# Patient Record
Sex: MALE | Race: WHITE | NOT HISPANIC OR LATINO | URBAN - NONMETROPOLITAN AREA
[De-identification: names, ages, dates, MRNs, and addresses within clinical notes are randomized per-mention and may not be internally consistent; named-entity substitution may affect disease eponyms.]

---

## 2016-01-22 PROBLEM — H52.4: Noted: 2018-01-11

## 2016-01-22 PROBLEM — H43.23: Noted: 2018-01-11

## 2016-01-22 PROBLEM — E11.3213: Noted: 2018-01-11

## 2018-01-11 PROBLEM — H43.21: Noted: 2018-01-11

## 2018-01-11 PROBLEM — E11.3293: Noted: 2018-01-11

## 2019-01-15 ENCOUNTER — IMPORTED ENCOUNTER (OUTPATIENT)
Dept: URBAN - NONMETROPOLITAN AREA CLINIC 1 | Facility: CLINIC | Age: 74
End: 2019-01-15

## 2019-01-15 PROCEDURE — 92014 COMPRE OPH EXAM EST PT 1/>: CPT

## 2019-01-15 PROCEDURE — 92134 CPTRZ OPH DX IMG PST SGM RTA: CPT

## 2019-01-15 PROCEDURE — 92015 DETERMINE REFRACTIVE STATE: CPT

## 2019-01-15 NOTE — PATIENT DISCUSSION
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  If any changed noted patient to call or come into the office ASAP - Recommend patient seeing Dr. Omayra Rome patient agrees with plan- Continue to [place] OU- Discussed diagnosis in detail with patient- Discussed signs and symptoms of progression- Discussed UV protection- Progression noted on todays exam - REcommend cataract eval with Dr. Marino Level will consider after patient sees Dr. Omayra Rome- Continue to monitorAsteroid Hyalosis OD- Discussed diagnosis in detail with patient- Patient is stable at this time- Continue to monitorHyperopia / Presbyopia OU- Discussed diagnosis in detail with patient- Recommend holding off on glasses Rx at this time - Conitnue to monitor; Dr's Notes: MR 1/15/19DFE 1/15/19Optos 1/22/16OCT 1/15/19

## 2021-03-10 ENCOUNTER — IMPORTED ENCOUNTER (OUTPATIENT)
Dept: URBAN - NONMETROPOLITAN AREA CLINIC 1 | Facility: CLINIC | Age: 76
End: 2021-03-10

## 2021-03-10 PROBLEM — H25.813: Noted: 2021-03-10

## 2021-03-10 PROBLEM — H52.4: Noted: 2018-01-11

## 2021-03-10 PROBLEM — E11.3293: Noted: 2018-01-11

## 2021-03-10 PROBLEM — H43.21: Noted: 2018-01-11

## 2021-03-10 PROBLEM — E11.3213: Noted: 2018-01-11

## 2021-03-10 PROBLEM — H43.23: Noted: 2018-01-11

## 2021-03-10 PROBLEM — H25.812: Noted: 2021-03-10

## 2021-03-10 PROCEDURE — 92014 COMPRE OPH EXAM EST PT 1/>: CPT

## 2021-03-10 PROCEDURE — 92015 DETERMINE REFRACTIVE STATE: CPT

## 2021-03-10 NOTE — PATIENT DISCUSSION
NIDDM with edema 15 years- Discussed diagnosis in detail with patient- OCT done previously show macula edema OD but improved but decreased VA noted today and OS WNL - Stressed importance of good blood sugar control- Patient states last A1C was under 7 and last blood sugar was 132- Recommend no sodas- Patient was given a [employer] previously to follow. If any changed noted patient to call or come into the office ASAP - Patient has been released from Dr. Sosa Devries- Notes to [address] [facility] - Continue to monitorCataracts OU- Discussed diagnosis in detail with patient- Discussed signs and symptoms of progression- Discussed UV protection- Progression noted on todays exam- Will re-evaluate in 6 months.  May consider referral with Dr. Neo Cintron - Continue to monitorAsteroid Hyalosis OD- Discussed diagnosis in detail with patient- Patient is stable at this time- Continue to monitorHyperopia / Presbyopia OU- Discussed diagnosis in detail with patient- Explained changing glasses will not help alot due to cataracts progressing - Conitnue to monitor; 's Notes: MR 1/15/19DFE 1/15/19Optos 1/22/16OCT 1/15/19

## 2021-09-15 ENCOUNTER — IMPORTED ENCOUNTER (OUTPATIENT)
Dept: URBAN - NONMETROPOLITAN AREA CLINIC 1 | Facility: CLINIC | Age: 76
End: 2021-09-15

## 2021-09-15 PROCEDURE — 92134 CPTRZ OPH DX IMG PST SGM RTA: CPT

## 2021-09-15 PROCEDURE — 99214 OFFICE O/P EST MOD 30 MIN: CPT

## 2021-09-15 NOTE — PATIENT DISCUSSION
NIDDM with edema 15 years- Discussed diagnosis in detail with patient- OCT done today show macula edema OD but stable from 2019 scan- Stressed importance of good blood sugar control- Patient states last A1C was under 7.2- Recommend no sodas- Patient was given a [address] previously to follow.  If any changed noted patient to call or come into the office ASAP - Patient has been released from Dr. Daugherty Face- Notes to [address] [facility] - Continue to monitorCataracts OU- Discussed diagnosis in detail with patient- Discussed signs and symptoms of progression- Discussed UV protection- VA has dropped off OU since last visit  - Progression noted on todays exam would like to get Dr. Jennifer Swift blessing before proceeding with cataract eval - Continue to monitorAsteroid Hyalosis OD- Discussed diagnosis in detail with patient- Patient is stable at this time- Continue to monitorHyperopia / Presbyopia OU- Discussed diagnosis in detail with patient- Explained changing glasses will not help alot due to cataracts progressing - Conitnue to monitor; 's Notes: MR 1/15/19DFE 1/15/19Optos 1/22/16OCT 9/15/21

## 2022-01-18 ENCOUNTER — IMPORTED ENCOUNTER (OUTPATIENT)
Dept: URBAN - NONMETROPOLITAN AREA CLINIC 1 | Facility: CLINIC | Age: 77
End: 2022-01-18

## 2022-01-18 PROCEDURE — 99214 OFFICE O/P EST MOD 30 MIN: CPT

## 2022-01-18 PROCEDURE — 92134 CPTRZ OPH DX IMG PST SGM RTA: CPT

## 2022-01-18 NOTE — PATIENT DISCUSSION
NIDDM with edema 15 years OU- Discussed diagnosis in detail with patient- OCT done today OD shows chronic macula edema amd OS shows min macular edema but stable and improved from last year- Stressed importance of good blood sugar control- Patient states last A1C was under 7.2- Recommend no sodas- Patient was given a [address] previously to follow.  If any changed noted patient to call or come into the office ASAP - Patient has been released from Dr. Sue Gallegos- Notes to [address] [facility] - Continue to monitorCataracts OU- Discussed diagnosis in detail with patient- Discussed signs and symptoms of progression- Discussed UV protection- BAT done today OD 20/200 and OS 20/25- Recommend cataract eval with Dr. Ligia Sanches patient DEFERS at this time - Patient states that he also going to start being followed by the [place] - Continue to monitorAsteroid Hyalosis OD- Discussed diagnosis in detail with patient- Patient is stable at this time- Continue to monitorHyperopia / Presbyopia OU- Discussed diagnosis in detail with patient- Explained changing glasses will not help alot due to cataracts progressing - May consider making reading glasses - Conitnue to monitor; 's Notes: MR 1/15/19DFE 1/15/19Optos 1/22/16OCT 9/15/21

## 2022-04-15 ASSESSMENT — VISUAL ACUITY
OS_SC: 20/25
OD_SC: 20/70+2
OD_PH: 20/50
OD_GLARE: 20/200
OD_SC: 20/70-
OD_PH: 20/50-
OD_SC: 20/50+
OS_SC: 20/20-
OD_SC: 20/60-
OS_GLARE: 20/25
OD_PH: 20/60
OS_SC: 20/30
OS_SC: 20/20

## 2022-04-15 ASSESSMENT — TONOMETRY
OS_IOP_MMHG: 19
OD_IOP_MMHG: 18
OS_IOP_MMHG: 20
OS_IOP_MMHG: 18
OS_IOP_MMHG: 18
OD_IOP_MMHG: 19
OD_IOP_MMHG: 18
OD_IOP_MMHG: 18

## 2022-07-19 ENCOUNTER — FOLLOW UP (OUTPATIENT)
Dept: URBAN - NONMETROPOLITAN AREA CLINIC 1 | Facility: CLINIC | Age: 77
End: 2022-07-19

## 2022-07-19 DIAGNOSIS — E11.3293: ICD-10-CM

## 2022-07-19 DIAGNOSIS — H52.4: ICD-10-CM

## 2022-07-19 PROCEDURE — 92015 DETERMINE REFRACTIVE STATE: CPT

## 2022-07-19 PROCEDURE — 99214 OFFICE O/P EST MOD 30 MIN: CPT

## 2022-07-19 PROCEDURE — 92250 FUNDUS PHOTOGRAPHY W/I&R: CPT

## 2022-07-19 ASSESSMENT — VISUAL ACUITY
OS_CC: 20/20-1
OD_CC: 20/60-1

## 2022-07-19 ASSESSMENT — TONOMETRY
OD_IOP_MMHG: 18
OS_IOP_MMHG: 18

## 2022-07-19 NOTE — PATIENT DISCUSSION
NIDDM with edema 15 years OU. Discussed diagnosis in detail with patient. OCT done previously OD shows chronic macula edema amd OS shows min macular edema but stable and improved from last year. Stressed importance of good blood sugar control. Patient states last A1C was under 6.9. Recommend no sodas. Patient was given a [address] previously to follow. If any changed noted patient to call or come into the office ASAP.

## 2023-01-20 ENCOUNTER — FOLLOW UP (OUTPATIENT)
Dept: URBAN - NONMETROPOLITAN AREA CLINIC 1 | Facility: CLINIC | Age: 78
End: 2023-01-20

## 2023-01-20 DIAGNOSIS — E11.3293: ICD-10-CM

## 2023-01-20 DIAGNOSIS — H25.813: ICD-10-CM

## 2023-01-20 PROCEDURE — 92134 CPTRZ OPH DX IMG PST SGM RTA: CPT

## 2023-01-20 PROCEDURE — 99214 OFFICE O/P EST MOD 30 MIN: CPT

## 2023-01-20 ASSESSMENT — TONOMETRY
OD_IOP_MMHG: 18
OS_IOP_MMHG: 18

## 2023-01-20 ASSESSMENT — VISUAL ACUITY
OS_PH: 20/30-2
OS_CC: 20/40+2
OD_CC: 20/60-1
OU_CC: 20/30+2

## 2023-01-20 NOTE — PATIENT DISCUSSION
Will set up an appt for evaluation by Dr. Mohini Szymanski in/after May. If Dr. Rere Saba doesn't agree with plan, we will cancel this appt and reschedule in future if needed. Patient agrees with plan.

## 2023-01-20 NOTE — PATIENT DISCUSSION
NIDDM with edema 15 years OU. Discussed diagnosis in detail with patient. OCT done previously OD shows chronic macula edema amd OS shows min macular edema but stable and improved from last year. Stressed importance of good blood sugar control. Patient states last A1C was under 6.9. Recommend no sodas. Patient was given a [address] previously to follow. If any changed noted patient to call or come into the office ASAP.

## 2023-01-20 NOTE — PATIENT DISCUSSION
Patient has seen Dr. Kapil Herron in the past, will refer back to his care for evaluation. Patient agrees.

## 2023-01-20 NOTE — PATIENT DISCUSSION
Patient needs cataract surgery, but feel his macula/retina needs to be evaluated prior to doing so. Patient agrees with plan.

## 2023-06-14 ENCOUNTER — CONSULTATION/EVALUATION (OUTPATIENT)
Dept: URBAN - NONMETROPOLITAN AREA CLINIC 1 | Facility: CLINIC | Age: 78
End: 2023-06-14

## 2023-06-14 DIAGNOSIS — H25.813: ICD-10-CM

## 2023-06-14 DIAGNOSIS — E11.3293: ICD-10-CM

## 2023-06-14 PROCEDURE — 99214 OFFICE O/P EST MOD 30 MIN: CPT

## 2023-06-14 PROCEDURE — 92134 CPTRZ OPH DX IMG PST SGM RTA: CPT

## 2023-06-14 ASSESSMENT — TONOMETRY
OD_IOP_MMHG: 25
OS_IOP_MMHG: 26

## 2023-06-14 ASSESSMENT — VISUAL ACUITY
OD_PH: 20/80
OD_PAM: 20/30
OS_CC: 20/30
OS_PH: 20/30
OS_SC: 20/100
OS_CC: 20/40
OD_CC: 20/100
OD_CC: 20/70
OD_SC: 20/200-1
OS_AM: 20/20

## 2023-07-26 ENCOUNTER — PRE-OP/H&P (OUTPATIENT)
Dept: URBAN - NONMETROPOLITAN AREA CLINIC 1 | Facility: CLINIC | Age: 78
End: 2023-07-26

## 2023-07-26 VITALS
SYSTOLIC BLOOD PRESSURE: 116 MMHG | HEART RATE: 70 BPM | DIASTOLIC BLOOD PRESSURE: 76 MMHG | HEIGHT: 69.5 IN | WEIGHT: 200 LBS | BODY MASS INDEX: 28.96 KG/M2

## 2023-07-26 DIAGNOSIS — E78.2: ICD-10-CM

## 2023-07-26 DIAGNOSIS — I10: ICD-10-CM

## 2023-07-26 DIAGNOSIS — N40: ICD-10-CM

## 2023-07-26 DIAGNOSIS — Z01.818: ICD-10-CM

## 2023-07-26 PROCEDURE — 99213 OFFICE O/P EST LOW 20 MIN: CPT

## 2023-08-08 ENCOUNTER — SURGERY/PROCEDURE (OUTPATIENT)
Dept: URBAN - NONMETROPOLITAN AREA CLINIC 2 | Facility: CLINIC | Age: 78
End: 2023-08-08

## 2023-08-08 DIAGNOSIS — H25.813: ICD-10-CM

## 2023-08-08 PROCEDURE — 65772LRI LRI DURING CAT SX

## 2023-08-08 PROCEDURE — 66984CV REMOVE CATARACT, INSERT LENS, CUSTOM VISION

## 2023-08-08 PROCEDURE — 66999LNX LENSX

## 2023-08-08 PROCEDURE — 99199PCV PROF CUSTOM VISION PACKAGE

## 2023-08-09 ENCOUNTER — POST-OP (OUTPATIENT)
Dept: URBAN - NONMETROPOLITAN AREA CLINIC 1 | Facility: CLINIC | Age: 78
End: 2023-08-09

## 2023-08-09 DIAGNOSIS — Z98.41: ICD-10-CM

## 2023-08-09 PROCEDURE — 99024 POSTOP FOLLOW-UP VISIT: CPT

## 2023-08-09 ASSESSMENT — VISUAL ACUITY
OD_SC: 20/60-1
OS_SC: 20/40+2

## 2023-08-09 ASSESSMENT — TONOMETRY
OD_IOP_MMHG: 26
OS_IOP_MMHG: 26

## 2023-08-16 ENCOUNTER — POST-OP (OUTPATIENT)
Dept: URBAN - NONMETROPOLITAN AREA CLINIC 1 | Facility: CLINIC | Age: 78
End: 2023-08-16

## 2023-08-16 ENCOUNTER — PRE-OP/H&P (OUTPATIENT)
Dept: URBAN - NONMETROPOLITAN AREA CLINIC 1 | Facility: CLINIC | Age: 78
End: 2023-08-16

## 2023-08-16 VITALS
HEART RATE: 62 BPM | DIASTOLIC BLOOD PRESSURE: 76 MMHG | SYSTOLIC BLOOD PRESSURE: 116 MMHG | WEIGHT: 200 LBS | BODY MASS INDEX: 28.96 KG/M2 | HEIGHT: 69.5 IN

## 2023-08-16 DIAGNOSIS — I10: ICD-10-CM

## 2023-08-16 DIAGNOSIS — E78.2: ICD-10-CM

## 2023-08-16 DIAGNOSIS — Z01.818: ICD-10-CM

## 2023-08-16 DIAGNOSIS — Z98.41: ICD-10-CM

## 2023-08-16 DIAGNOSIS — H25.812: ICD-10-CM

## 2023-08-16 DIAGNOSIS — N40: ICD-10-CM

## 2023-08-16 PROCEDURE — 99213 OFFICE O/P EST LOW 20 MIN: CPT

## 2023-08-16 ASSESSMENT — VISUAL ACUITY
OD_SC: 20/100+2
OS_AM: 20/20
OS_PH: 20/30
OS_CC: 20/40
OS_CC: 20/30
OS_SC: 20/100

## 2023-08-16 ASSESSMENT — TONOMETRY
OS_IOP_MMHG: 19
OD_IOP_MMHG: 18

## 2023-08-29 ENCOUNTER — SURGERY/PROCEDURE (OUTPATIENT)
Dept: URBAN - NONMETROPOLITAN AREA CLINIC 1 | Facility: CLINIC | Age: 78
End: 2023-08-29

## 2023-08-29 DIAGNOSIS — H25.812: ICD-10-CM

## 2023-08-29 PROCEDURE — 66999LNX LENSX

## 2023-08-29 PROCEDURE — 66984CV REMOVE CATARACT, INSERT LENS, CUSTOM VISION

## 2023-08-29 PROCEDURE — 65772LRI LRI DURING CAT SX

## 2023-08-29 PROCEDURE — 99199PCV PROF CUSTOM VISION PACKAGE

## 2023-08-30 ENCOUNTER — POST-OP (OUTPATIENT)
Dept: URBAN - NONMETROPOLITAN AREA CLINIC 1 | Facility: CLINIC | Age: 78
End: 2023-08-30

## 2023-08-30 DIAGNOSIS — Z98.41: ICD-10-CM

## 2023-08-30 DIAGNOSIS — Z98.42: ICD-10-CM

## 2023-08-30 PROCEDURE — 99024 POSTOP FOLLOW-UP VISIT: CPT

## 2023-08-30 ASSESSMENT — VISUAL ACUITY
OD_SC: 20/40
OS_SC: 20/29-1

## 2023-08-30 ASSESSMENT — TONOMETRY
OS_IOP_MMHG: 21
OD_IOP_MMHG: 17

## 2023-09-05 ENCOUNTER — POST-OP (OUTPATIENT)
Dept: URBAN - NONMETROPOLITAN AREA CLINIC 1 | Facility: CLINIC | Age: 78
End: 2023-09-05

## 2023-09-05 DIAGNOSIS — Z98.41: ICD-10-CM

## 2023-09-05 DIAGNOSIS — Z98.42: ICD-10-CM

## 2023-09-05 PROCEDURE — 99024 POSTOP FOLLOW-UP VISIT: CPT

## 2023-09-05 ASSESSMENT — VISUAL ACUITY
OS_SC: 20/25-2
OD_SC: 20/50-1
OD_PH: 20/40-2

## 2023-09-05 ASSESSMENT — TONOMETRY
OS_IOP_MMHG: 17
OD_IOP_MMHG: 18

## 2023-12-14 ENCOUNTER — FOLLOW UP (OUTPATIENT)
Dept: URBAN - NONMETROPOLITAN AREA CLINIC 1 | Facility: CLINIC | Age: 78
End: 2023-12-14

## 2023-12-14 DIAGNOSIS — E11.3293: ICD-10-CM

## 2023-12-14 DIAGNOSIS — H52.4: ICD-10-CM

## 2023-12-14 DIAGNOSIS — Z96.1: ICD-10-CM

## 2023-12-14 PROCEDURE — 99214 OFFICE O/P EST MOD 30 MIN: CPT

## 2023-12-14 PROCEDURE — 92015 DETERMINE REFRACTIVE STATE: CPT

## 2023-12-14 ASSESSMENT — VISUAL ACUITY
OD_SC: 20/50-1
OS_SC: 20/30
OU_SC: 20/25

## 2023-12-14 ASSESSMENT — TONOMETRY
OS_IOP_MMHG: 17
OD_IOP_MMHG: 18

## 2024-06-17 ENCOUNTER — FOLLOW UP (OUTPATIENT)
Dept: URBAN - NONMETROPOLITAN AREA CLINIC 1 | Facility: CLINIC | Age: 79
End: 2024-06-17

## 2024-06-17 DIAGNOSIS — E11.3293: ICD-10-CM

## 2024-06-17 DIAGNOSIS — H26.493: ICD-10-CM

## 2024-06-17 PROCEDURE — 92134 CPTRZ OPH DX IMG PST SGM RTA: CPT

## 2024-06-17 PROCEDURE — 99213 OFFICE O/P EST LOW 20 MIN: CPT

## 2024-06-17 ASSESSMENT — TONOMETRY
OS_IOP_MMHG: 17
OD_IOP_MMHG: 16

## 2024-06-17 ASSESSMENT — VISUAL ACUITY
OD_CC: 20/50
OS_CC: 20/29

## 2024-12-24 ENCOUNTER — FOLLOW UP (OUTPATIENT)
Age: 79
End: 2024-12-24

## 2024-12-24 DIAGNOSIS — E11.3293: ICD-10-CM

## 2024-12-24 DIAGNOSIS — Z96.1: ICD-10-CM

## 2024-12-24 DIAGNOSIS — H26.493: ICD-10-CM

## 2024-12-24 PROCEDURE — 92250 FUNDUS PHOTOGRAPHY W/I&R: CPT

## 2024-12-24 PROCEDURE — 99213 OFFICE O/P EST LOW 20 MIN: CPT

## 2025-06-26 ENCOUNTER — FOLLOW UP (OUTPATIENT)
Age: 80
End: 2025-06-26

## 2025-06-26 DIAGNOSIS — Z96.1: ICD-10-CM

## 2025-06-26 DIAGNOSIS — H26.493: ICD-10-CM

## 2025-06-26 DIAGNOSIS — E11.3293: ICD-10-CM

## 2025-06-26 PROCEDURE — 99213 OFFICE O/P EST LOW 20 MIN: CPT

## 2025-06-26 PROCEDURE — 92134 CPTRZ OPH DX IMG PST SGM RTA: CPT

## 2025-07-16 ENCOUNTER — CONSULTATION/EVALUATION (OUTPATIENT)
Age: 80
End: 2025-07-16

## 2025-07-16 DIAGNOSIS — Z96.1: ICD-10-CM

## 2025-07-16 DIAGNOSIS — E11.3293: ICD-10-CM

## 2025-07-16 DIAGNOSIS — H26.493: ICD-10-CM

## 2025-07-16 PROCEDURE — 66821 AFTER CATARACT LASER SURGERY: CPT

## 2025-07-16 PROCEDURE — 99214 OFFICE O/P EST MOD 30 MIN: CPT | Mod: 57

## 2025-07-30 ENCOUNTER — CLINIC PROCEDURE ONLY (OUTPATIENT)
Age: 80
End: 2025-07-30

## 2025-08-07 ENCOUNTER — POST-OP (OUTPATIENT)
Age: 80
End: 2025-08-07

## 2025-08-07 DIAGNOSIS — H52.4: ICD-10-CM

## 2025-08-07 DIAGNOSIS — Z98.890: ICD-10-CM

## 2025-08-07 PROCEDURE — 92015 DETERMINE REFRACTIVE STATE: CPT

## 2025-08-07 PROCEDURE — 99024 POSTOP FOLLOW-UP VISIT: CPT
